# Patient Record
Sex: MALE | Race: ASIAN | NOT HISPANIC OR LATINO | Employment: STUDENT | URBAN - METROPOLITAN AREA
[De-identification: names, ages, dates, MRNs, and addresses within clinical notes are randomized per-mention and may not be internally consistent; named-entity substitution may affect disease eponyms.]

---

## 2019-02-20 ENCOUNTER — APPOINTMENT (EMERGENCY)
Dept: RADIOLOGY | Facility: HOSPITAL | Age: 24
End: 2019-02-20

## 2019-02-20 ENCOUNTER — HOSPITAL ENCOUNTER (EMERGENCY)
Facility: HOSPITAL | Age: 24
Discharge: HOME | End: 2019-02-20
Attending: EMERGENCY MEDICINE | Admitting: EMERGENCY MEDICINE

## 2019-02-20 VITALS
TEMPERATURE: 98.6 F | SYSTOLIC BLOOD PRESSURE: 154 MMHG | HEART RATE: 96 BPM | DIASTOLIC BLOOD PRESSURE: 82 MMHG | OXYGEN SATURATION: 99 % | WEIGHT: 167 LBS | RESPIRATION RATE: 16 BRPM

## 2019-02-20 DIAGNOSIS — M54.50: Primary | ICD-10-CM

## 2019-02-20 PROCEDURE — 99283 EMERGENCY DEPT VISIT LOW MDM: CPT

## 2019-02-20 PROCEDURE — 73610 X-RAY EXAM OF ANKLE: CPT

## 2019-02-20 PROCEDURE — 96372 THER/PROPH/DIAG INJ SC/IM: CPT

## 2019-02-20 RX ORDER — METHOCARBAMOL 500 MG/1
500 TABLET, FILM COATED ORAL ONCE
Status: COMPLETED | OUTPATIENT
Start: 2019-02-20 | End: 2019-02-20

## 2019-02-20 RX ORDER — KETOROLAC TROMETHAMINE 30 MG/ML
15 INJECTION, SOLUTION INTRAMUSCULAR; INTRAVENOUS ONCE
Status: DISCONTINUED | OUTPATIENT
Start: 2019-02-20 | End: 2019-02-20

## 2019-02-20 RX ORDER — ACETAMINOPHEN 325 MG/1
975 TABLET ORAL ONCE
Status: COMPLETED | OUTPATIENT
Start: 2019-02-20 | End: 2019-02-20

## 2019-02-20 RX ORDER — KETOROLAC TROMETHAMINE 30 MG/ML
15 INJECTION, SOLUTION INTRAMUSCULAR; INTRAVENOUS ONCE
Status: COMPLETED | OUTPATIENT
Start: 2019-02-20 | End: 2019-02-20

## 2019-02-20 RX ORDER — TRAMADOL HYDROCHLORIDE 50 MG/1
50 TABLET ORAL EVERY 6 HOURS PRN
COMMUNITY

## 2019-02-20 RX ORDER — NAPROXEN 500 MG/1
500 TABLET ORAL 2 TIMES DAILY WITH MEALS
Qty: 10 TABLET | Refills: 0 | Status: SHIPPED | OUTPATIENT
Start: 2019-02-20 | End: 2019-02-25

## 2019-02-20 RX ORDER — LIDOCAINE 50 MG/G
1 PATCH TOPICAL ONCE
Status: DISCONTINUED | OUTPATIENT
Start: 2019-02-20 | End: 2019-02-20 | Stop reason: HOSPADM

## 2019-02-20 RX ADMIN — METHOCARBAMOL 500 MG: 500 TABLET, FILM COATED ORAL at 00:53

## 2019-02-20 RX ADMIN — ACETAMINOPHEN 975 MG: 325 TABLET, FILM COATED ORAL at 00:53

## 2019-02-20 RX ADMIN — LIDOCAINE 1 PATCH: 50 PATCH CUTANEOUS at 00:53

## 2019-02-20 RX ADMIN — KETOROLAC TROMETHAMINE 15 MG: 30 INJECTION, SOLUTION INTRAMUSCULAR at 00:53

## 2019-02-20 NOTE — ED ATTENDING ATTESTATION
I, [address] [place] [address], DO, saw and evaluated the patient  I have discussed the patient with the resident/non-physician practitioner and agree with the resident's/non-physician practitioner's findings, Plan of Care, and MDM as documented in the resident's/non-physician practitioner's note, except where noted  All available labs and Radiology studies were reviewed  At this point I agree with the current assessment done in the Emergency Department  I have conducted an independent evaluation of this patient a history and physical is as follows:    78-year-old male presents with left low back pain for 2 days as well as left ankle pain  Patient states the back pain is worse with movement, no radicular symptoms  No bowel or bladder complaints, no fevers or chills  Also complains of pain in left ankle, had ORIF to that ankle in the past   On exam-no acute distress, heart regular, no respiratory distress, muscle spasm and tenderness noted paraspinal muscles, worse on the left side in the upper lumbar area, muscle strength 5/5 bilateral lower extremities with sensation intact, mild tenderness to palpation left lateral malleolus without swelling, distally neurovascular intact    Plan-x-ray left ankle, treat back pain, suspect musculoskeletal    Critical Care Time  Procedures

## 2019-02-20 NOTE — ED PROVIDER NOTES
History  Chief Complaint   Patient presents with    Back Pain     pt reports left lower leg/calf  and lower back pain x 3 days  Pt denies new injury and is walking without assistance   Leg Pain     HPI     15-year-old male presenting with chief complaint left lower back pain for last 2 days  Patient woke up with this pain  Patient states he feels like a tightness in his left lower back  Currently pain is a 5/10 with walking becomes a 7/10  This pain does not radiate  Patient also has pain for last number months of the left ankle  Patient has had ORIF to that ankle  Is dull and achy and severity and description  It is worse with walking  The back is spasm like  Currently the pain is a 5/10 7/10 becoming more sharp with walking  Patient denies saddle anesthesia, motor weakness, numbness tingling  Patient does not use IV drugs  No chronic steroids  No trauma  No fever chills rigors  Patient does not have a history of malignancy  Patient denies neck pain neck stiffness chest pain palpitations headache lightheadedness dizziness abdominal pain nausea vomiting diarrhea constipation  Patient denies fecal incontinence  Patient denies urinary retention, urinary incontinence  Prior to Admission Medications   Prescriptions Last Dose Informant Patient Reported? Taking?   traMADol (ULTRAM) 50 mg tablet   Yes Yes   Sig: Take 50 mg by mouth every 6 (six) hours as needed for moderate pain      Facility-Administered Medications: None       Past Medical History:   Diagnosis Date    Anxiety        No past surgical history on file  No family history on file  I have reviewed and agree with the history as documented      Social History     Tobacco Use    Smoking status: Current Every Day Smoker     Packs/day: 0 50    Smokeless tobacco: Never Used   Substance Use Topics    Alcohol use: Yes     Comment: socially    Drug use: Never        Review of Systems   Musculoskeletal: Positive for back pain and gait problem  Negative for arthralgias, joint swelling, myalgias, neck pain and neck stiffness  Neurological: Negative for dizziness, tremors, seizures, syncope, facial asymmetry, speech difficulty, weakness, light-headedness, numbness and headaches  All other systems reviewed and are negative  Physical Exam  ED Triage Vitals   Temperature Pulse Respirations Blood Pressure SpO2   02/20/19 0019 02/20/19 0019 02/20/19 0019 02/20/19 0019 02/20/19 0019   98 6 °F (37 °C) 96 16 154/82 99 %      Temp Source Heart Rate Source Patient Position - Orthostatic VS BP Location FiO2 (%)   02/20/19 0019 02/20/19 0019 -- -- --   Oral Monitor         Pain Score       02/20/19 0020       8           Orthostatic Vital Signs  Vitals:    02/20/19 0019   BP: 154/82   Pulse: 96       Physical Exam   Constitutional: He is oriented to person, place, and time  He appears well-developed and well-nourished  No distress  HENT:   Head: Normocephalic and atraumatic  Right Ear: External ear normal    Left Ear: External ear normal    Nose: Nose normal    Mouth/Throat: Oropharynx is clear and moist  No oropharyngeal exudate  Eyes: Pupils are equal, round, and reactive to light  Conjunctivae and EOM are normal  Right eye exhibits no discharge  Left eye exhibits no discharge  No scleral icterus  Neck: Normal range of motion  Neck supple  No JVD present  No tracheal deviation present  No thyromegaly present  Cardiovascular: Normal rate, regular rhythm, normal heart sounds and intact distal pulses  No murmur heard  Pulmonary/Chest: Effort normal and breath sounds normal  No stridor  No respiratory distress  He has no wheezes  He has no rales  Abdominal: Soft  Bowel sounds are normal  He exhibits no distension and no mass  There is no tenderness  There is no rebound and no guarding  Musculoskeletal: Normal range of motion  He exhibits no edema or deformity          Cervical back: He exhibits normal range of motion, no tenderness and no bony tenderness  Thoracic back: He exhibits normal range of motion, no tenderness and no bony tenderness  Lumbar back: He exhibits tenderness and bony tenderness  He exhibits normal range of motion  Back:         Feet:    Lymphadenopathy:     He has no cervical adenopathy  Neurological: He is alert and oriented to person, place, and time  No cranial nerve deficit  He exhibits normal muscle tone  Coordination normal  GCS eye subscore is 4  GCS verbal subscore is 5  GCS motor subscore is 6  Reflex Scores:       Tricep reflexes are 2+ on the right side and 2+ on the left side  Bicep reflexes are 2+ on the right side and 2+ on the left side  Patellar reflexes are 2+ on the right side and 2+ on the left side  Achilles reflexes are 2+ on the right side and 2+ on the left side  5/5 strength in upper lower extremities, sensation intact throughout, cerebellar testing including finger-to-nose heel-to-shin rapid alternating movements are intact, cranial nerves 2-12 intact  No pronator drift  Gait is steady but antalgic  Tandem gait intact  Visual fields are intact  Speech is articulate  Follows commands without difficulties  No clonus in all 4 extremities  Skin: Skin is warm and dry  No rash noted  He is not diaphoretic  No erythema  Psychiatric: He has a normal mood and affect  His behavior is normal  Judgment and thought content normal    Nursing note and vitals reviewed        ED Medications  Medications   acetaminophen (TYLENOL) tablet 975 mg (975 mg Oral Given 2/20/19 0053)   methocarbamol (ROBAXIN) tablet 500 mg (500 mg Oral Given 2/20/19 0053)   ketorolac (TORADOL) injection 15 mg (15 mg Intramuscular Given 2/20/19 0053)       Diagnostic Studies  Results Reviewed     None                 XR ankle 3+ views LEFT   ED Interpretation by Israel Wall DO (02/20 0145)   No acute fracture, osteolysis noted            Procedures  Procedures      Phone Consults  ED Phone Contact    ED Course                               MDM  Number of Diagnoses or Management Options  Lumbago syndrome:   Diagnosis management comments: 40-year-old presenting with back pain  Patient has spasm  Differential diagnosis includes but not limited to cauda equina eye, spinal pathology, musculoskeletal spasm, osseous fracture  No trauma  No red flags on history or exam   Doubt cauda equina night doubt spinal pathology  No midline tenderness  No fracture suspected  Muscle spasm impression  Patient given analgesia  Patient felt better  Patient will follow up with PCP  PCP follow-up in next 2-3 days  ED return precautions discussed  Patient demonstrates understanding  Disposition  Final diagnoses:   Lumbago syndrome     Time reflects when diagnosis was documented in both MDM as applicable and the Disposition within this note     Time User Action Codes Description Comment    2/20/2019  1:46 AM Nigel Briones Add [M54 5] Lumbago syndrome       ED Disposition     ED Disposition Condition Date/Time Comment    Discharge Stable Wed Feb 20, 2019  1:46 AM Anjel Feliz Midget discharge to home/self care  Return precautions were discussed with patient  Patient understands when to return to  Emergency department  Patient agrees to discharge plan and follow up care                 Follow-up Information     Follow up With Specialties Details Why Contact Info Additional [address] in 3 days  [address] [id number]  [facility] Emergency Department Emergency Medicine Go to  As needed, If symptoms worsen [address]  [phone]  ED, [address] [place] [address], [phone]          Discharge Medication List as of 2/20/2019  1:47 AM      CONTINUE these medications which have NOT CHANGED    Details   traMADol (ULTRAM) 50 mg tablet Take 50 mg by mouth every 6 (six) hours as needed for moderate pain, Historical Med           No discharge procedures on file  ED Provider  Attending physically available and evaluated Reilly Hodges I managed the patient along with the ED Attending      Electronically Signed by         Jamila Orona [address],   02/20/19 3860

## 2019-10-15 ENCOUNTER — HOSPITAL ENCOUNTER (EMERGENCY)
Facility: HOSPITAL | Age: 24
Discharge: HOME | End: 2019-10-15
Attending: EMERGENCY MEDICINE | Admitting: EMERGENCY MEDICINE

## 2019-10-15 ENCOUNTER — APPOINTMENT (EMERGENCY)
Dept: RADIOLOGY | Facility: HOSPITAL | Age: 24
End: 2019-10-15

## 2019-10-15 VITALS
SYSTOLIC BLOOD PRESSURE: 165 MMHG | RESPIRATION RATE: 16 BRPM | OXYGEN SATURATION: 97 % | TEMPERATURE: 97.6 F | WEIGHT: 156.53 LBS | HEART RATE: 103 BPM | DIASTOLIC BLOOD PRESSURE: 86 MMHG

## 2019-10-15 DIAGNOSIS — F41.9 ANXIETY: ICD-10-CM

## 2019-10-15 DIAGNOSIS — M54.6 THORACIC BACK PAIN: Primary | ICD-10-CM

## 2019-10-15 DIAGNOSIS — M25.572 LEFT ANKLE PAIN: ICD-10-CM

## 2019-10-15 PROCEDURE — 99285 EMERGENCY DEPT VISIT HI MDM: CPT | Performed by: EMERGENCY MEDICINE

## 2019-10-15 PROCEDURE — 73610 X-RAY EXAM OF ANKLE: CPT

## 2019-10-15 PROCEDURE — 99283 EMERGENCY DEPT VISIT LOW MDM: CPT

## 2019-10-15 PROCEDURE — 96372 THER/PROPH/DIAG INJ SC/IM: CPT

## 2019-10-15 RX ORDER — ACETAMINOPHEN 325 MG/1
975 TABLET ORAL ONCE
Status: COMPLETED | OUTPATIENT
Start: 2019-10-15 | End: 2019-10-15

## 2019-10-15 RX ORDER — KETOROLAC TROMETHAMINE 30 MG/ML
15 INJECTION, SOLUTION INTRAMUSCULAR; INTRAVENOUS ONCE
Status: COMPLETED | OUTPATIENT
Start: 2019-10-15 | End: 2019-10-15

## 2019-10-15 RX ORDER — METHOCARBAMOL 500 MG/1
500 TABLET, FILM COATED ORAL 2 TIMES DAILY
Qty: 6 TABLET | Refills: 0 | Status: SHIPPED | OUTPATIENT
Start: 2019-10-15 | End: 2019-10-18

## 2019-10-15 RX ORDER — LIDOCAINE 50 MG/G
2 PATCH TOPICAL ONCE
Status: DISCONTINUED | OUTPATIENT
Start: 2019-10-15 | End: 2019-10-15 | Stop reason: HOSPADM

## 2019-10-15 RX ORDER — METHOCARBAMOL 500 MG/1
1000 TABLET, FILM COATED ORAL ONCE
Status: COMPLETED | OUTPATIENT
Start: 2019-10-15 | End: 2019-10-15

## 2019-10-15 RX ADMIN — LIDOCAINE 2 PATCH: 50 PATCH CUTANEOUS at 05:03

## 2019-10-15 RX ADMIN — KETOROLAC TROMETHAMINE 15 MG: 30 INJECTION, SOLUTION INTRAMUSCULAR at 04:57

## 2019-10-15 RX ADMIN — ACETAMINOPHEN 975 MG: 325 TABLET ORAL at 04:55

## 2019-10-15 RX ADMIN — METHOCARBAMOL TABLETS 1000 MG: 500 TABLET, COATED ORAL at 04:55

## 2019-10-15 NOTE — ED PROVIDER NOTES
History  Chief Complaint   Patient presents with    Medical Problem     Pt states he had surgery on right leg 3 years ago  2 weeks ago pt states he fell  Today pt has back pain and a cough  75-year-old male presenting for evaluation of thoracic back pain and ankle pain  Patient has had thoracic back pain for 1 month he states he has previously used Lidoderm patches for this and it has helped however the past few days he has been sleeping and having difficulty due to the pain in his back  Patient states he has recently been working out at the gym more and that has exacerbated his pain is not taken any medications for the pain as no associated radiation down his legs however did fall yesterday and injured his left ankle which he has previously had operated on  Patient is worried that he has displacement of the plate that was placed at previous time  Patient has full range of motion he does have reproducible back pain this thoracic paraspinal area nothing lumbar spine nothing midline no step-offs or deformities  Patient requesting anxiety medication due to a history of anxiety        History provided by:  Patient  Medical Problem   Severity:  Mild  Onset quality:  Gradual  Timing:  Constant  Progression:  Worsening  Chronicity:  New  Associated symptoms: no abdominal pain, no chest pain, no diarrhea, no fatigue, no fever, no headaches, no nausea, no rash, no shortness of breath, no sore throat and no vomiting    Back Pain   Location:  Thoracic spine  Quality:  Aching  Radiates to:  Does not radiate  Pain severity:  Mild  Pain is:  Unable to specify  Onset quality:  Gradual  Timing:  Constant  Progression:  Worsening  Chronicity:  New  Context: physical stress    Relieved by:  None tried  Worsened by:  Palpation and twisting  Ineffective treatments:  None tried  Associated symptoms: no abdominal pain, no chest pain, no dysuria, no fever, no headaches and no weakness        Prior to Admission Medications Prescriptions Last Dose Informant Patient Reported? Taking?   naproxen (NAPROSYN) 500 mg tablet   No No   Sig: Take 1 tablet (500 mg total) by mouth 2 (two) times a day with meals for 5 days   traMADol (ULTRAM) 50 mg tablet Not Taking at Unknown time  Yes No   Sig: Take 50 mg by mouth every 6 (six) hours as needed for moderate pain      Facility-Administered Medications: None       Past Medical History:   Diagnosis Date    Anxiety        History reviewed  No pertinent surgical history  History reviewed  No pertinent family history  I have reviewed and agree with the history as documented  Social History     Tobacco Use    Smoking status: Current Every Day Smoker     Packs/day: 0 50    Smokeless tobacco: Never Used   Substance Use Topics    Alcohol use: Yes     Comment: socially    Drug use: Never        Review of Systems   Constitutional: Negative for chills, fatigue and fever  HENT: Negative for sore throat  Eyes: Negative for visual disturbance  Respiratory: Negative for shortness of breath  Cardiovascular: Negative for chest pain  Gastrointestinal: Negative for abdominal pain, constipation, diarrhea, nausea and vomiting  Genitourinary: Negative for difficulty urinating, dysuria and hematuria  Musculoskeletal: Positive for arthralgias and back pain  Skin: Negative for rash  Neurological: Negative for syncope, weakness and headaches  Hematological: Negative for adenopathy  Psychiatric/Behavioral: Negative for agitation and behavioral problems  All other systems reviewed and are negative        Physical Exam  ED Triage Vitals   Temperature Pulse Respirations Blood Pressure SpO2   10/15/19 0431 10/15/19 0431 10/15/19 0431 10/15/19 0431 10/15/19 0431   97 6 °F (36 4 °C) 103 16 165/86 97 %      Temp src Heart Rate Source Patient Position - Orthostatic VS BP Location FiO2 (%)   -- -- -- -- --             Pain Score       10/15/19 0455       Worst Possible Pain Orthostatic Vital Signs  Vitals:    10/15/19 0431   BP: 165/86   Pulse: 103       Physical Exam   Constitutional: He is oriented to person, place, and time  He appears well-developed and well-nourished  HENT:   Head: Normocephalic and atraumatic  Eyes: Conjunctivae and EOM are normal  No scleral icterus  Neck: Normal range of motion  Neck supple  Cardiovascular: Normal rate and regular rhythm  No murmur heard  Pulmonary/Chest: Effort normal and breath sounds normal            Abdominal: Soft  Bowel sounds are normal  There is no tenderness  Musculoskeletal: Normal range of motion  He exhibits tenderness  Feet:    Neurological: He is alert and oriented to person, place, and time  Skin: Skin is warm and dry  Psychiatric: He has a normal mood and affect  His behavior is normal    Nursing note and vitals reviewed  ED Medications  Medications   lidocaine (LIDODERM) 5 % patch 2 patch (2 patches Topical Medication Applied 10/15/19 2798)   ketorolac (TORADOL) injection 15 mg (15 mg Intramuscular Given 10/15/19 0457)   acetaminophen (TYLENOL) tablet 975 mg (975 mg Oral Given 10/15/19 0455)   methocarbamol (ROBAXIN) tablet 1,000 mg (1,000 mg Oral Given 10/15/19 0455)       Diagnostic Studies  Results Reviewed     None                 XR ankle 3+ views LEFT   ED Interpretation by Chester Barton MD (10/15 0528)   Unremarkable for any acute osseous abnormality hardware appears intact compared to previous ankle              Procedures  Splint application  Date/Time: 10/15/2019 5:57 AM  Performed by: Chester Barton MD  Authorized by: Cehster Barton MD     Patient location:  Bedside  Procedure performed by emergency physician: Yes    Consent:     Consent obtained:  Verbal    Consent given by:  Patient    Alternatives discussed:  No treatment  Universal protocol:     Patient identity confirmed:  Verbally with patient  Procedure details:     Laterality:  Left    Location:  Ankle Ankle:  L ankle    Strapping: no      Supplies:  Elastic bandage  Post-procedure details:     Pain:  Unchanged    Sensation:  Normal    Neurovascular Exam: skin pink      Patient tolerance of procedure: Tolerated well, no immediate complications            ED Course                               MDM  Number of Diagnoses or Management Options  Anxiety: new and does not require workup  Left ankle pain: new and requires workup  Thoracic back pain: new and requires workup  Diagnosis management comments:   13-year-old male presenting for thoracic back pain and ankle pain, will provide analgesia and obtain x-ray film of the ankle to rule out any misplaced hardware or fracture  X-rays unremarkable by my read, Ace bandage placed will have follow-up with primary care which was provided for the patient as well as work note as requested  Amount and/or Complexity of Data Reviewed  Tests in the radiology section of CPT®: ordered and reviewed  Review and summarize past medical records: yes  Independent visualization of images, tracings, or specimens: yes        Disposition  Final diagnoses:   Anxiety   Thoracic back pain   Left ankle pain     Time reflects when diagnosis was documented in both MDM as applicable and the Disposition within this note     Time User Action Codes Description Comment    10/15/2019  5:29 AM Radha Haymaker Add [F41 9] Anxiety     10/15/2019  5:30 AM Radha Haymaker Add [M54 6] Thoracic back pain     10/15/2019  5:30 AM Radha Haymaker Add [M25 572] Left ankle pain     10/15/2019  5:30 AM Radha Haymaker Modify [F41 9] Anxiety     10/15/2019  5:30 AM Radha Haymaker Modify [M54 6] Thoracic back pain       ED Disposition     ED Disposition Condition Date/Time Comment    Discharge Stable Tu Oct 15, 2019  5:29 AM Anjel Tellez discharge to home/self care              Follow-up Information     Follow up With Specialties Details Why Contact Info Additional Information    [facility] [address]  Internal Medicine Schedule an appointment as soon as possible for a visit   [facility] [address] [facility] [phone]  [address], [address] [facility]  [address] , [place], [place], [address]   [facility] Emergency Department Emergency Medicine  If symptoms worsen [address]  [phone]  ED, [address], [address] [phone]          Discharge Medication List as of 10/15/2019  5:31 AM      START taking these medications    Details   methocarbamol (ROBAXIN) 500 mg tablet Take 1 tablet (500 mg total) by mouth 2 (two) times a day for 3 days, Starting Tue 10/15/2019, Until Fri 10/18/2019, Print         CONTINUE these medications which have NOT CHANGED    Details   naproxen (NAPROSYN) 500 mg tablet Take 1 tablet (500 mg total) by mouth 2 (two) times a day with meals for 5 days, Starting Wed 2/20/2019, Until Mon 2/25/2019, Print      traMADol (ULTRAM) 50 mg tablet Take 50 mg by mouth every 6 (six) hours as needed for moderate pain, Historical Med           No discharge procedures on file  ED Provider  Attending physically available and evaluated Mercedez Roche I managed the patient along with the ED Attending      Electronically Signed by         Wolfgang Hashimoto, MD  10/15/19 0488

## 2019-10-15 NOTE — DISCHARGE INSTRUCTIONS
Follow-up with below primary care information for refills on medication or follow-up for your back pain

## 2019-10-15 NOTE — ED ATTENDING ATTESTATION
10/15/2019  I, Floridalma Manning MD, saw and evaluated the patient  I have discussed the patient with the resident/non-physician practitioner and agree with the resident's/non-physician practitioner's findings, Plan of Care, and MDM as documented in the resident's/non-physician practitioner's note, except where noted  All available labs and Radiology studies were reviewed  I was present for key portions of any procedure(s) performed by the resident/non-physician practitioner and I was immediately available to provide assistance  At this point I agree with the current assessment done in the Emergency Department  I have conducted an independent evaluation of this patient a history and physical is as follows:    ED Course     Emergency Department Note- Anjel Bowling Nurse 20 y o  male MRN: [id number]    Unit/Bed#: ED 11 Encounter: [id number]    Tao Bagley is a 20 y o  male who presents with   Chief Complaint   Patient presents with    Medical Problem     Pt states he had surgery on right leg 3 years ago  2 weeks ago pt states he fell  Today pt has back pain and a cough  History of Present Illness   HPI:  Tao Bagley is a 20 y o  male who presents for evaluation of:  Flare of back pain  Patient also notes some pain in his left leg  Patient fell earlier this evening which exacerbated back pain  Review of Systems   Constitutional: Negative for chills and fever  Musculoskeletal: Positive for back pain  Negative for neck pain  Neurological: Negative for headaches  All other systems reviewed and are negative  Historical Information   Past Medical History:   Diagnosis Date    Anxiety      History reviewed  No pertinent surgical history    Social History   Social History     Substance and Sexual Activity   Alcohol Use Yes    Comment: socially     Social History     Substance and Sexual Activity   Drug Use Never     Social History     Tobacco Use   Smoking Status Current Every Day Smoker    Packs/day: 0 50   Smokeless Tobacco Never Used     Family History: non-contributory    Meds/Allergies   all medications and allergies reviewed  No Known Allergies    Objective   First Vitals:   Blood Pressure: 165/86 (10/15/19 0431)  Pulse: 103 (10/15/19 0431)  Temperature: 97 6 °F (36 4 °C) (10/15/19 0431)  Respirations: 16 (10/15/19 0431)  Weight - Scale: 71 kg (156 lb 8 4 oz) (10/15/19 0431)  SpO2: 97 % (10/15/19 0431)    Current Vitals:   Blood Pressure: 165/86 (10/15/19 0431)  Pulse: 103 (10/15/19 0431)  Temperature: 97 6 °F (36 4 °C) (10/15/19 0431)  Respirations: 16 (10/15/19 0431)  Weight - Scale: 71 kg (156 lb 8 4 oz) (10/15/19 0431)  SpO2: 97 % (10/15/19 0431)    No intake or output data in the 24 hours ending 10/15/19 0459    Invasive Devices     None                 Physical Exam   Constitutional: He is oriented to person, place, and time  He appears well-developed and well-nourished  HENT:   Head: Normocephalic and atraumatic  Neurological: He is alert and oriented to person, place, and time  Skin: Skin is warm and dry  Capillary refill takes less than 2 seconds  Psychiatric: He has a normal mood and affect  His behavior is normal  Judgment and thought content normal    Nursing note and vitals reviewed  Medical Decision Makin  Back and leg pain: likely musculoskeletal: ibuprofen for pain control    No results found for this or any previous visit (from the past 36 hour(s))  XR ankle 3+ views LEFT    (Results Pending)         Portions of the record may have been created with voice recognition software  Occasional wrong word or "sound a like" substitutions may have occurred due to the inherent limitations of voice recognition software  Read the chart carefully and recognize, using context, where substitutions have occurred          Critical Care Time  Procedures